# Patient Record
Sex: FEMALE | Race: WHITE
[De-identification: names, ages, dates, MRNs, and addresses within clinical notes are randomized per-mention and may not be internally consistent; named-entity substitution may affect disease eponyms.]

---

## 2018-07-10 ENCOUNTER — HOSPITAL ENCOUNTER (OUTPATIENT)
Dept: HOSPITAL 62 - RT | Age: 43
End: 2018-07-10
Attending: INTERNAL MEDICINE
Payer: OTHER GOVERNMENT

## 2018-07-10 DIAGNOSIS — R06.02: Primary | ICD-10-CM

## 2018-07-10 PROCEDURE — 94727 GAS DIL/WSHOT DETER LNG VOL: CPT

## 2018-07-10 PROCEDURE — 94729 DIFFUSING CAPACITY: CPT

## 2018-07-10 PROCEDURE — 94060 EVALUATION OF WHEEZING: CPT

## 2018-07-10 NOTE — PULMONARY FUNCTION TEST
Pulmonary Function Test


Date of Procedure:: 07/10/18


INDICATION:: Dyspnea


Referring Provider: Dr. Gray


Technician: Bianka Bruno RRT, RCP





- Report


Spirometry: 





FVC 2.83 L 80%    postbronchodilator 3.26 L 92%





FEV1 1.95 L 67%    postbronchodilator 2.30 L 79%





FEV1/FVC % 69    postbronchodilator 71    predicted 84





FEF 25-75% 1.17 L 36%    postbronchodilator 1.60 L 49%





Total lung capacity 2.86 L    53%





Vital capacity 2.83 L    80%





Inspiratory capacity 1.92 L





FRC N2 0.94 L    31%





ERV 0.83





RV 0.04      2%





RV/TLC %       1%    predicted 33





Diffusion capacity 8.4   38%





DLCO/VA 3.45      80%








Study demonstrates moderate obstructive ventilatory defect with good response 

to bronchodilator therapy.  Restrictive defect is noted as moderate (

restrictive defect may mask the degree of obstruction) no hyperinflation no air 

trapping.  Severe decrease in diffusion capacity.


Lung Volume: 





Total lung capacity 2.86 L    53%





Vital capacity 2.83 L    80%





Inspiratory capacity 1.92 L





FRC N2 0.94 L    31%





ERV 0.83





RV 0.04      2%





RV/TLC %       1%    predicted 33





Diffusion Capactity: 





Diffusion capacity 8.4   38%





DLCO/VA 3.45      80%


Impression: 





Study demonstrates moderate obstructive ventilatory defect with good response 

to bronchodilator therapy.  Restrictive defect is noted as moderate (

restrictive defect may mask the degree of obstruction) no hyperinflation no air 

trapping.  Severe decrease in diffusion capacity.

## 2018-07-17 ENCOUNTER — HOSPITAL ENCOUNTER (OUTPATIENT)
Dept: HOSPITAL 62 - SP | Age: 43
End: 2018-07-17
Attending: INTERNAL MEDICINE
Payer: OTHER GOVERNMENT

## 2018-07-17 DIAGNOSIS — M32.9: Primary | ICD-10-CM

## 2018-07-17 DIAGNOSIS — R05: ICD-10-CM

## 2018-07-17 DIAGNOSIS — R06.02: ICD-10-CM

## 2018-07-17 PROCEDURE — 93306 TTE W/DOPPLER COMPLETE: CPT

## 2018-07-17 NOTE — XC[facility]ER[facility] REPORT
[address] [address]

                             Tel: [phone]

                             Fax: [phone]



                    Transthoracic Echocardiogram Report

____________________________________________________________________________



Name: SANDRA KINGSLEY

MRN: [id number]                Age: 43 yrs

Gender: Female                 : 1975

Patient Status: Outpatient     Patient Location: [facility]

Account #: [id number]

Study Date: 2018 11:29 AM

Accession #: [id number]

____________________________________________________________________________



Height: 66 in        Weight: 143 lb        BSA: 1.7 m2



____________________________________________________________________________

Procedure: A complete two-dimensional transthoracic echocardiogram was

performed (2D, M-mode, spectral and color flow Doppler). The study was

technically adequate with some images being suboptimal in quality.

Reason For Study: SOB





Ordering Physician: ZULEMA BALDERAS

Performed By: Angella Day

____________________________________________________________________________





Interpretation Summary

The left ventricular ejection fraction is normal.

The left ventricle is grossly normal size.

There is normal left ventricular wall thickness.

LV diastolic function could not be adequately assessed.

Wall motion cannot be accurately commented on, but no definite regional

wall motion abnormalities noted.

The right ventricle is grossly normal size.

The right ventricular systolic function is normal.

The right atrium is normal in size

The left atrial size is normal.

There is no mitral valve stenosis.

There is a trace to mild amount of mitral regurgitation

There is no aortic valve stenosis

No aortic regurgitation is present.

There is a trace to mild amount of tricuspid regurgitation

Right ventricular systolic pressure is at the upper limits of normal

The aortic root is not well visualized but is probably normal size.

The inferior vena cava appeared normal and decreased > 50% with respiration

(RAP 5-10 mmHg)

There is no pericardial effusion.



____________________________________________________________________________



MMode/2D Measurements & Calculations

RVDd: 2.5 cm  LVIDd: 5.2 cm   FS: 33.8 %            Ao root diam: 2.8 cm

IVSd: 0.79 cm LVIDs: 3.4 cm   EDV(Teich): 127.7 ml

              LVPWd: 0.78 cm  ESV(Teich): 48.2 ml   Ao root area: 6.1 cm2

                              EF(Teich): 62.3 %     LA dimension: 3.4 cm



Doppler Measurements & Calculations

MV E max sun:      MV P1/2t max sun:    Ao V2 max:        LV V1 max P.2 cm/sec       104.1 cm/sec         137.8 cm/sec      6.5 mmHg

MV A max sun:      MV P1/2t: 48.7 msec  Ao max PG:        LV V1 max:

72.1 cm/sec                             7.6 mmHg          127.8 cm/sec

MV E/A: 1.4        MVA(P1/2t): 4.5 cm2

                   MV dec slope:

                   626.3 cm/sec2

                   MV dec time: 0.18 sec



        _____________________________________________________________

PA V2 max:         PI end-d sun:        TR max sun:

109.1 cm/sec       86.9 cm/sec          248.7 cm/sec

PA max PG:                              TR max P.8 mmHg                                24.7 mmHg

____________________________________________________________________________



Left Ventricle

The left ventricle is grossly normal size. There is normal left ventricular

wall thickness. The left ventricular ejection fraction is normal. LV

diastolic function could not be adequately assessed. Wall motion cannot be

accurately commented on, but no definite regional wall motion abnormalities

noted.



Right Ventricle

The right ventricle is grossly normal size. There is normal right

ventricular wall thickness. The right ventricular systolic function is

normal.



Atria

The right atrium is normal in size. The left atrial size is normal.

Interarterial septum not well visualized and not well dopplered. Cannot

comment on ASD/PFO presence.



Mitral Valve

The mitral valve is grossly normal. There is no mitral valve stenosis.

There is a trace to mild amount of mitral regurgitation.





Aortic Valve

The aortic valve is grossly normal. There is no aortic valve stenosis. No

aortic regurgitation is present.



Tricuspid Valve

The tricuspid valve is not well visualized, but is grossly normal. There is

no tricuspid stenosis. There is a trace to mild amount of tricuspid

regurgitation. Right ventricular systolic pressure is at the upper limits

of normal.



Pulmonic Valve

The pulmonic valve is not well visualized.



Great Vessels

The aortic root is not well visualized but is probably normal size. The

inferior vena cava appeared normal and decreased > 50% with respiration

(RAP 5-10 mmHg).



Effusions

There is no pericardial effusion.





____________________________________________________________________________

Electronically signed by:      Tiffany Dc      on 2018 06:53 PM



CC: ZULEMA BALDERAS

>

Tiffany Dc